# Patient Record
Sex: MALE | Race: WHITE | Employment: FULL TIME | URBAN - METROPOLITAN AREA
[De-identification: names, ages, dates, MRNs, and addresses within clinical notes are randomized per-mention and may not be internally consistent; named-entity substitution may affect disease eponyms.]

---

## 2017-05-17 ENCOUNTER — RECORDS - HEALTHEAST (OUTPATIENT)
Dept: LAB | Facility: CLINIC | Age: 52
End: 2017-05-17

## 2017-05-17 LAB
CHOLEST SERPL-MCNC: 211 MG/DL
FASTING STATUS PATIENT QL REPORTED: ABNORMAL
HDLC SERPL-MCNC: 58 MG/DL
LDLC SERPL CALC-MCNC: 136 MG/DL
PSA SERPL-MCNC: 0.4 NG/ML (ref 0–3.5)
TRIGL SERPL-MCNC: 84 MG/DL

## 2021-05-25 ENCOUNTER — RECORDS - HEALTHEAST (OUTPATIENT)
Dept: ADMINISTRATIVE | Facility: CLINIC | Age: 56
End: 2021-05-25

## 2021-05-31 ENCOUNTER — RECORDS - HEALTHEAST (OUTPATIENT)
Dept: ADMINISTRATIVE | Facility: CLINIC | Age: 56
End: 2021-05-31

## 2023-04-27 ENCOUNTER — LAB REQUISITION (OUTPATIENT)
Dept: LAB | Facility: CLINIC | Age: 58
End: 2023-04-27
Payer: COMMERCIAL

## 2023-04-27 ENCOUNTER — TRANSFERRED RECORDS (OUTPATIENT)
Dept: HEALTH INFORMATION MANAGEMENT | Facility: CLINIC | Age: 58
End: 2023-04-27

## 2023-04-27 DIAGNOSIS — Z13.1 ENCOUNTER FOR SCREENING FOR DIABETES MELLITUS: ICD-10-CM

## 2023-04-27 DIAGNOSIS — Z12.5 ENCOUNTER FOR SCREENING FOR MALIGNANT NEOPLASM OF PROSTATE: ICD-10-CM

## 2023-04-27 DIAGNOSIS — R26.89 OTHER ABNORMALITIES OF GAIT AND MOBILITY: ICD-10-CM

## 2023-04-27 DIAGNOSIS — Z13.220 ENCOUNTER FOR SCREENING FOR LIPOID DISORDERS: ICD-10-CM

## 2023-04-27 PROCEDURE — 80053 COMPREHEN METABOLIC PANEL: CPT | Mod: ORL | Performed by: PHYSICIAN ASSISTANT

## 2023-04-27 PROCEDURE — 80061 LIPID PANEL: CPT | Mod: ORL | Performed by: PHYSICIAN ASSISTANT

## 2023-04-27 PROCEDURE — 82607 VITAMIN B-12: CPT | Mod: ORL | Performed by: PHYSICIAN ASSISTANT

## 2023-04-27 PROCEDURE — 84443 ASSAY THYROID STIM HORMONE: CPT | Mod: ORL | Performed by: PHYSICIAN ASSISTANT

## 2023-04-27 PROCEDURE — 82746 ASSAY OF FOLIC ACID SERUM: CPT | Mod: ORL | Performed by: PHYSICIAN ASSISTANT

## 2023-04-27 PROCEDURE — G0103 PSA SCREENING: HCPCS | Mod: ORL | Performed by: PHYSICIAN ASSISTANT

## 2023-04-28 LAB
ALBUMIN SERPL BCG-MCNC: 4.5 G/DL (ref 3.5–5.2)
ALP SERPL-CCNC: 46 U/L (ref 40–129)
ALT SERPL W P-5'-P-CCNC: 13 U/L (ref 10–50)
ANION GAP SERPL CALCULATED.3IONS-SCNC: 12 MMOL/L (ref 7–15)
AST SERPL W P-5'-P-CCNC: 17 U/L (ref 10–50)
BILIRUB SERPL-MCNC: 0.4 MG/DL
BUN SERPL-MCNC: 18 MG/DL (ref 6–20)
CALCIUM SERPL-MCNC: 9.7 MG/DL (ref 8.6–10)
CHLORIDE SERPL-SCNC: 105 MMOL/L (ref 98–107)
CHOLEST SERPL-MCNC: 210 MG/DL
CREAT SERPL-MCNC: 1.13 MG/DL (ref 0.67–1.17)
DEPRECATED HCO3 PLAS-SCNC: 27 MMOL/L (ref 22–29)
FOLATE SERPL-MCNC: 20.4 NG/ML (ref 4.6–34.8)
GFR SERPL CREATININE-BSD FRML MDRD: 76 ML/MIN/1.73M2
GLUCOSE SERPL-MCNC: 108 MG/DL (ref 70–99)
HDLC SERPL-MCNC: 60 MG/DL
LDLC SERPL CALC-MCNC: 134 MG/DL
NONHDLC SERPL-MCNC: 150 MG/DL
POTASSIUM SERPL-SCNC: 5 MMOL/L (ref 3.4–5.3)
PROT SERPL-MCNC: 7.3 G/DL (ref 6.4–8.3)
PSA SERPL DL<=0.01 NG/ML-MCNC: 0.5 NG/ML (ref 0–3.5)
SODIUM SERPL-SCNC: 144 MMOL/L (ref 136–145)
TRIGL SERPL-MCNC: 80 MG/DL
TSH SERPL DL<=0.005 MIU/L-ACNC: 14.3 UIU/ML (ref 0.3–4.2)
VIT B12 SERPL-MCNC: 658 PG/ML (ref 232–1245)

## 2023-05-02 ENCOUNTER — MEDICAL CORRESPONDENCE (OUTPATIENT)
Dept: HEALTH INFORMATION MANAGEMENT | Facility: CLINIC | Age: 58
End: 2023-05-02
Payer: COMMERCIAL

## 2023-06-21 ENCOUNTER — OFFICE VISIT (OUTPATIENT)
Dept: CARDIOLOGY | Facility: CLINIC | Age: 58
End: 2023-06-21
Payer: COMMERCIAL

## 2023-06-21 VITALS
OXYGEN SATURATION: 96 % | DIASTOLIC BLOOD PRESSURE: 64 MMHG | SYSTOLIC BLOOD PRESSURE: 108 MMHG | RESPIRATION RATE: 16 BRPM | WEIGHT: 203.4 LBS | HEART RATE: 73 BPM

## 2023-06-21 DIAGNOSIS — E78.5 HYPERLIPIDEMIA LDL GOAL <100: ICD-10-CM

## 2023-06-21 DIAGNOSIS — R07.2 PRECORDIAL PAIN: Primary | ICD-10-CM

## 2023-06-21 PROCEDURE — 99204 OFFICE O/P NEW MOD 45 MIN: CPT | Performed by: INTERNAL MEDICINE

## 2023-06-21 PROCEDURE — 93000 ELECTROCARDIOGRAM COMPLETE: CPT | Performed by: INTERNAL MEDICINE

## 2023-06-21 RX ORDER — LEVOTHYROXINE SODIUM 50 UG/1
TABLET ORAL
COMMUNITY
Start: 2023-04-28

## 2023-06-21 NOTE — PROGRESS NOTES
HEART CARE ENCOUNTER CONSULTATON NOTE      [facility]  [phone]      Assessment/Recommendations   Assessment/Plan:  1.  Chest discomfort.  Symptoms have some atypical features.  He does however have some risk factors for coronary artery disease and the chest discomfort has persisted over a number month period of time.  We discussed additional work-up and we will plan an exercise stress test as well as a resting echocardiogram.  I discussed this in detail and he is in agreement.    2.  Risk modification.  We talked about coronary calcium scoring to further define his 10-year cardiovascular risk.  He has a tobacco history and mild hyperlipidemia.  He is interested in pursuing coronary calcium scoring we will arrange as well in the near future.  He is encouraged to fully discontinue tobacco.    3.  Patient mentions concern regarding balance issues that were apparently worked up quite significantly with neurology without obvious etiology based on chart notes and discussion with patient.  Did suggest that the patient consider contacting the dizzy and balance clinic for further assessment.    Plan 1.  GXT  2.  Resting echocardiogram  3.  Coronary calcium scoring    CG today demonstrates sinus rhythm, left axis deviation with minimal changes to suggest LVH.         History of Present Illness/Subjective    HPI: Jeff Gunn is a 57 year old male new patient to me today.  I have reviewed the most recent notes from primary care.  Primary care indicates history of episodic chest discomfort.  The description by the primary care physician was that the discomfort feels achy but sometimes a squeezing sensation.  The symptoms did not reportedly get worse with eating, chest wall movement or with exertion.  The note indicated no associated shortness of breath, palpitation or dizziness.  In addition he has had concerns regarding balance and double vision concerns regarding difficulty speaking.   Work-up per the chart record has included MRI of the brain, neurology consult, neuro-ophthalmology consult and EEG which reportedly was all unremarkable. .  Patient was started on omeprazole per primary care the stress echocardiogram was ordered but the results are not I did not certain whether it was completed.  No recent ECG available in the chart record.  ECG from June 19, 2008 was within normal limits.    Patient reports to me a similar story.  He states that for a number of months he has had episodic chest discomfort that short period of time perhaps upwards of 1 to 2 minutes but no longer.  Mid substernal, nonradiating described as an achy sensation which comes on abruptly and then slowly resolves.  He did not find any significant improvement with the omeprazole and reportedly stopped the omeprazole.  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  However he walks regularly including most recently on a vacation upwards of 10 miles denies predictable chest discomfort.    Cardiac risk factors include tobacco history.  He previously smoked 1 to 2 packs/week but now smokes 1 cigarette on occasion..  He drinks alcohol 2-3 times per week.  He does not have a history of hypertension, or diabetes.  Lipids are mildly suboptimal with most recent results revealing cholesterol 210, triglycerides of 80, .  No knowledge of premature family history of heart disease.      Past medical history  History of double vision and vertigo.  History of tobacco use    Family history, father is alive at age 76 estranged.  They are alive with hypothyroid and colon polyps.  Paternal grandmother with stroke, colon cancer and breast cancer  Paternal uncle with colon cancer             Physical Examination  Review of Systems   Vitals: 108/64, weight 203 pounds, rate of 73 and regular.  Wt Readings from Last 3  Encounters:   No data found for Wt       General Appearance:   no distress, normal body habitus   ENT/Mouth: membranes moist, no oral lesions or bleeding gums.      EYES:  no scleral icterus, normal conjunctivae   Neck: no carotid bruits or thyromegaly   Chest/Lungs:   lungs are clear to auscultation, no rales or wheezing,equal chest wall expansion    Cardiovascular:   Regular. Normal first and second heart sounds with no murmurs, rubs, or gallops; the carotid, radial and posterior tibial pulses are intact, Jugular venous pressure within normal limits, no significant edema bilaterally    Abdomen:  no bruits, or tenderness; bowel sounds are present   Extremities: no cyanosis or clubbing   Skin: no xanthelasma, warm.    Neurologic: , no tremors     Psychiatric: alert and oriented x3, calm        Please refer above for cardiac ROS details.        Medical History  Surgical History Family History Social History   No past medical history on file.  No past surgical history on file.  No family history on file.     Social History     Socioeconomic History     Marital status:      Spouse name: Not on file     Number of children: Not on file     Years of education: Not on file     Highest education level: Not on file   Occupational History     Not on file   Tobacco Use     Smoking status: Not on file     Smokeless tobacco: Not on file   Substance and Sexual Activity     Alcohol use: Not on file     Drug use: Not on file     Sexual activity: Not on file   Other Topics Concern     Not on file   Social History Narrative     Not on file     Social Determinants of Health     Financial Resource Strain: Not on file   Food Insecurity: Not on file   Transportation Needs: Not on file   Physical Activity: Not on file   Stress: Not on file   Social Connections: Not on file   Intimate Partner Violence: Not on file   Housing Stability: Not on file           Medications  Allergies   No current outpatient medications on file.     Not  on File       Lab Results    Chemistry/lipid CBC Cardiac Enzymes/BNP/TSH/INR   Recent Labs   Lab Test 04/27/23 0752   CHOL 210*   HDL 60   *   TRIG 80     Recent Labs   Lab Test 04/27/23 0752 05/17/17  0814   * 136*     Recent Labs   Lab Test 04/27/23 0752      POTASSIUM 5.0   CHLORIDE 105   CO2 27   *   BUN 18.0   CR 1.13   GFRESTIMATED 76   NELSON 9.7     Recent Labs   Lab Test 04/27/23 0752   CR 1.13     No results for input(s): A1C in the last 11558 hours.       No results for input(s): WBC, HGB, HCT, MCV, PLT in the last 76265 hours.  No results for input(s): HGB in the last 95638 hours. No results for input(s): TROPONINI in the last 64455 hours.  No results for input(s): BNP, NTBNPI, NTBNP in the last 94668 hours.  Recent Labs   Lab Test 04/27/23 0752   TSH 14.30*     No results for input(s): INR in the last 35847 hours.     Jeff Cooper MD

## 2023-06-21 NOTE — PATIENT INSTRUCTIONS
We talked about doing a stress test and a heart ultrasound.We are going to plan a coronary calcium ct test which is were we do a 5 minute ct picture of the heart blood vessels without needles.We also talked about the dizzy and balance clinic there number [phone].My nurse is Janel and her number is [phone]

## 2023-06-21 NOTE — LETTER
6/21/2023    Magen Mackay MD  [address]  [facility] [address]    RE: Jeff Gunn       Dear Colleague,     I had the pleasure of seeing Jeff Gunn in the [facility].    HEART CARE ENCOUNTER CONSULTATON NOTE      [facility] [place]  [phone]      Assessment/Recommendations   Assessment/Plan:  1.  Chest discomfort.  Symptoms have some atypical features.  He does however have some risk factors for coronary artery disease and the chest discomfort has persisted over a number month period of time.  We discussed additional work-up and we will plan an exercise stress test as well as a resting echocardiogram.  I discussed this in detail and he is in agreement.    2.  Risk modification.  We talked about coronary calcium scoring to further define his 10-year cardiovascular risk.  He has a tobacco history and mild hyperlipidemia.  He is interested in pursuing coronary calcium scoring we will arrange as well in the near future.  He is encouraged to fully discontinue tobacco.    3.  Patient mentions concern regarding balance issues that were apparently worked up quite significantly with neurology without obvious etiology based on chart notes and discussion with patient.  Did suggest that the patient consider contacting the dizzy and balance clinic for further assessment.    Plan 1.  GXT  2.  Resting echocardiogram  3.  Coronary calcium scoring    CG today demonstrates sinus rhythm, left axis deviation with minimal changes to suggest LVH.         History of Present Illness/Subjective    HPI: Jeff Gunn is a 57 year old male new patient to me today.  I have reviewed the most recent notes from primary care.  Primary care indicates history of episodic chest discomfort.  The description by the primary care physician was that the discomfort feels achy but sometimes a squeezing sensation.  The symptoms did not reportedly get worse with eating, chest wall movement  or with exertion.  The note indicated no associated shortness of breath, palpitation or dizziness.  In addition he has had concerns regarding balance and double vision concerns regarding difficulty speaking.  Work-up per the chart record has included MRI of the brain, neurology consult, neuro-ophthalmology consult and EEG which reportedly was all unremarkable. .  Patient was started on omeprazole per primary care the stress echocardiogram was ordered but the results are not I did not certain whether it was completed.  No recent ECG available in the chart record.  ECG from June 19, 2008 was within normal limits.    Patient reports to me a similar story.  He states that for a number of months he has had episodic chest discomfort that short period of time perhaps upwards of 1 to 2 minutes but no longer.  Mid substernal, nonradiating described as an achy sensation which comes on abruptly and then slowly resolves.  He did not find any significant improvement with the omeprazole and reportedly stopped the omeprazole.  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  However he walks regularly including most recently on a vacation upwards of 10 miles denies predictable chest discomfort.    Cardiac risk factors include tobacco history.  He previously smoked 1 to 2 packs/week but now smokes 1 cigarette on occasion..  He drinks alcohol 2-3 times per week.  He does not have a history of hypertension, or diabetes.  Lipids are mildly suboptimal with most recent results revealing cholesterol 210, triglycerides of 80, .  No knowledge of premature family history of heart disease.      Past medical history  History of double vision and vertigo.  History of tobacco use    Family history, father is alive at age 76 estranged.  They are alive with hypothyroid and colon polyps.  Paternal grandmother with  stroke, colon cancer and breast cancer  Paternal uncle with colon cancer             Physical Examination  Review of Systems   Vitals: 108/64, weight 203 pounds, rate of 73 and regular.  Wt Readings from Last 3 Encounters:   No data found for Wt       General Appearance:   no distress, normal body habitus   ENT/Mouth: membranes moist, no oral lesions or bleeding gums.      EYES:  no scleral icterus, normal conjunctivae   Neck: no carotid bruits or thyromegaly   Chest/Lungs:   lungs are clear to auscultation, no rales or wheezing,equal chest wall expansion    Cardiovascular:   Regular. Normal first and second heart sounds with no murmurs, rubs, or gallops; the carotid, radial and posterior tibial pulses are intact, Jugular venous pressure within normal limits, no significant edema bilaterally    Abdomen:  no bruits, or tenderness; bowel sounds are present   Extremities: no cyanosis or clubbing   Skin: no xanthelasma, warm.    Neurologic: , no tremors     Psychiatric: alert and oriented x3, calm        Please refer above for cardiac ROS details.        Medical History  Surgical History Family History Social History   No past medical history on file.  No past surgical history on file.  No family history on file.     Social History     Socioeconomic History    Marital status:      Spouse name: Not on file    Number of children: Not on file    Years of education: Not on file    Highest education level: Not on file   Occupational History    Not on file   Tobacco Use    Smoking status: Not on file    Smokeless tobacco: Not on file   Substance and Sexual Activity    Alcohol use: Not on file    Drug use: Not on file    Sexual activity: Not on file   Other Topics Concern    Not on file   Social History Narrative    Not on file     Social Determinants of Health     Financial Resource Strain: Not on file   Food Insecurity: Not on file   Transportation Needs: Not on file   Physical Activity: Not on file   Stress: Not on  file   Social Connections: Not on file   Intimate Partner Violence: Not on file   Housing Stability: Not on file           Medications  Allergies   No current outpatient medications on file.     Not on File       Lab Results    Chemistry/lipid CBC Cardiac Enzymes/BNP/TSH/INR   Recent Labs   Lab Test 04/27/23 0752   CHOL 210*   HDL 60   *   TRIG 80     Recent Labs   Lab Test 04/27/23 0752 05/17/17  0814   * 136*     Recent Labs   Lab Test 04/27/23 0752      POTASSIUM 5.0   CHLORIDE 105   CO2 27   *   BUN 18.0   CR 1.13   GFRESTIMATED 76   NELSON 9.7     Recent Labs   Lab Test 04/27/23 0752   CR 1.13     No results for input(s): A1C in the last 90971 hours.       No results for input(s): WBC, HGB, HCT, MCV, PLT in the last 77509 hours.  No results for input(s): HGB in the last 95909 hours. No results for input(s): TROPONINI in the last 62143 hours.  No results for input(s): BNP, NTBNPI, NTBNP in the last 46587 hours.  Recent Labs   Lab Test 04/27/23 0752   TSH 14.30*     No results for input(s): INR in the last 43387 hours.     Jeff Cooper MD                                        Thank you for allowing me to participate in the care of your patient.      Sincerely,     Jeff Cooper MD     [facility]  cc:   No referring provider defined for this encounter.

## 2023-06-22 LAB
ATRIAL RATE - MUSE: 69 BPM
DIASTOLIC BLOOD PRESSURE - MUSE: NORMAL MMHG
INTERPRETATION ECG - MUSE: NORMAL
P AXIS - MUSE: 6 DEGREES
PR INTERVAL - MUSE: 142 MS
QRS DURATION - MUSE: 96 MS
QT - MUSE: 374 MS
QTC - MUSE: 400 MS
R AXIS - MUSE: -48 DEGREES
SYSTOLIC BLOOD PRESSURE - MUSE: NORMAL MMHG
T AXIS - MUSE: 19 DEGREES
VENTRICULAR RATE- MUSE: 69 BPM

## 2023-06-30 ENCOUNTER — HOSPITAL ENCOUNTER (OUTPATIENT)
Dept: CT IMAGING | Facility: CLINIC | Age: 58
Discharge: HOME | End: 2023-06-30
Attending: INTERNAL MEDICINE | Admitting: INTERNAL MEDICINE
Payer: COMMERCIAL

## 2023-06-30 DIAGNOSIS — E78.5 HYPERLIPIDEMIA LDL GOAL <100: ICD-10-CM

## 2023-06-30 PROCEDURE — 75571 CT HRT W/O DYE W/CA TEST: CPT

## 2023-06-30 PROCEDURE — 75571 CT HRT W/O DYE W/CA TEST: CPT | Mod: 26 | Performed by: INTERNAL MEDICINE

## 2023-07-03 LAB
CV CALCIUM SCORE AGATSTON LM: 0
CV CALCIUM SCORING AGATSON LAD: 0
CV CALCIUM SCORING AGATSTON CX: 0
CV CALCIUM SCORING AGATSTON RCA: 0
CV CALCIUM SCORING AGATSTON TOTAL: 0

## 2023-07-18 ENCOUNTER — HOSPITAL ENCOUNTER (OUTPATIENT)
Dept: CARDIOLOGY | Facility: HOSPITAL | Age: 58
Discharge: HOME | End: 2023-07-18
Attending: INTERNAL MEDICINE | Admitting: INTERNAL MEDICINE
Payer: COMMERCIAL

## 2023-07-18 DIAGNOSIS — R07.2 PRECORDIAL PAIN: ICD-10-CM

## 2023-07-18 LAB
CV STRESS CURRENT BP HE: NORMAL
CV STRESS CURRENT HR HE: 102
CV STRESS CURRENT HR HE: 103
CV STRESS CURRENT HR HE: 107
CV STRESS CURRENT HR HE: 107
CV STRESS CURRENT HR HE: 110
CV STRESS CURRENT HR HE: 111
CV STRESS CURRENT HR HE: 111
CV STRESS CURRENT HR HE: 117
CV STRESS CURRENT HR HE: 125
CV STRESS CURRENT HR HE: 131
CV STRESS CURRENT HR HE: 131
CV STRESS CURRENT HR HE: 133
CV STRESS CURRENT HR HE: 134
CV STRESS CURRENT HR HE: 136
CV STRESS CURRENT HR HE: 140
CV STRESS CURRENT HR HE: 151
CV STRESS CURRENT HR HE: 155
CV STRESS CURRENT HR HE: 161
CV STRESS CURRENT HR HE: 163
CV STRESS CURRENT HR HE: 164
CV STRESS CURRENT HR HE: 165
CV STRESS CURRENT HR HE: 172
CV STRESS CURRENT HR HE: 172
CV STRESS CURRENT HR HE: 174
CV STRESS CURRENT HR HE: 176
CV STRESS CURRENT HR HE: 76
CV STRESS CURRENT HR HE: 86
CV STRESS DEVIATION TIME HE: NORMAL
CV STRESS ECHO PERCENT HR HE: NORMAL
CV STRESS EXERCISE STAGE HE: NORMAL
CV STRESS EXERCISE STAGE REACHED HE: NORMAL
CV STRESS FINAL RESTING BP HE: NORMAL
CV STRESS FINAL RESTING HR HE: 86
CV STRESS MAX HR HE: 176
CV STRESS MAX TREADMILL GRADE HE: 16
CV STRESS MAX TREADMILL SPEED HE: 4.2
CV STRESS PEAK DIA BP HE: NORMAL
CV STRESS PEAK SYS BP HE: NORMAL
CV STRESS PHASE HE: NORMAL
CV STRESS PROTOCOL HE: NORMAL
CV STRESS REASON STOPPED HE: NORMAL
CV STRESS RESTING PT POSITION HE: NORMAL
CV STRESS ST DEVIATION AMOUNT HE: NORMAL
CV STRESS ST DEVIATION ELEVATION HE: NORMAL
CV STRESS ST EVELATION AMOUNT HE: NORMAL
CV STRESS SYMPTOMS HE: NORMAL
CV STRESS TEST TYPE HE: NORMAL
CV STRESS TOTAL STAGE TIME MIN 1 HE: NORMAL
STRESS ECHO BASELINE DIASTOLIC HE: 65
STRESS ECHO BASELINE HR: 75
STRESS ECHO BASELINE SYSTOLIC BP: 106
STRESS ECHO LAST STRESS DIASTOLIC BP: 70
STRESS ECHO LAST STRESS HR: 174
STRESS ECHO LAST STRESS SYSTOLIC BP: 174
STRESS ECHO POST ESTIMATED WORKLOAD: 11.1
STRESS ECHO POST EXERCISE DUR MIN: 9
STRESS ECHO POST EXERCISE DUR SEC: 30
STRESS ECHO TARGET HR: 139

## 2023-07-18 PROCEDURE — 93018 CV STRESS TEST I&R ONLY: CPT | Performed by: INTERNAL MEDICINE

## 2023-07-18 PROCEDURE — 93017 CV STRESS TEST TRACING ONLY: CPT

## 2023-07-18 PROCEDURE — 93016 CV STRESS TEST SUPVJ ONLY: CPT | Performed by: INTERNAL MEDICINE

## 2023-08-02 ENCOUNTER — LAB REQUISITION (OUTPATIENT)
Dept: LAB | Facility: CLINIC | Age: 58
End: 2023-08-02
Payer: COMMERCIAL

## 2023-08-02 DIAGNOSIS — E03.9 HYPOTHYROIDISM, UNSPECIFIED: ICD-10-CM

## 2023-08-02 LAB
T4 FREE SERPL-MCNC: 1.19 NG/DL (ref 0.9–1.7)
TSH SERPL DL<=0.005 MIU/L-ACNC: 3.29 UIU/ML (ref 0.3–4.2)

## 2023-08-02 PROCEDURE — 84439 ASSAY OF FREE THYROXINE: CPT | Mod: ORL | Performed by: PHYSICIAN ASSISTANT

## 2023-08-02 PROCEDURE — 84443 ASSAY THYROID STIM HORMONE: CPT | Mod: ORL | Performed by: PHYSICIAN ASSISTANT

## 2023-08-24 ENCOUNTER — HOSPITAL ENCOUNTER (OUTPATIENT)
Dept: CARDIOLOGY | Facility: HOSPITAL | Age: 58
Discharge: HOME | End: 2023-08-24
Attending: INTERNAL MEDICINE | Admitting: INTERNAL MEDICINE
Payer: COMMERCIAL

## 2023-08-24 DIAGNOSIS — R07.2 PRECORDIAL PAIN: ICD-10-CM

## 2023-08-24 LAB — LVEF ECHO: NORMAL

## 2023-08-24 PROCEDURE — 93306 TTE W/DOPPLER COMPLETE: CPT | Mod: 26 | Performed by: INTERNAL MEDICINE

## 2023-08-24 PROCEDURE — 93306 TTE W/DOPPLER COMPLETE: CPT

## 2023-08-28 NOTE — RESULT ENCOUNTER NOTE
Echo read as normal function, no significant valve findings, gxt a few months ago was normal, ct calcium score was zero, mild increase in lipids 4/2023, suggest diet and exercise and recheck lipids in 6 to 12 months, can we inquire as to how the chest discomfort he described has been?  Ty mdg

## 2024-12-11 ENCOUNTER — LAB REQUISITION (OUTPATIENT)
Dept: LAB | Facility: CLINIC | Age: 59
End: 2024-12-11
Payer: COMMERCIAL

## 2024-12-11 DIAGNOSIS — Z00.00 ENCOUNTER FOR GENERAL ADULT MEDICAL EXAMINATION WITHOUT ABNORMAL FINDINGS: ICD-10-CM

## 2024-12-11 DIAGNOSIS — E03.9 HYPOTHYROIDISM, UNSPECIFIED: ICD-10-CM

## 2024-12-11 LAB
ALBUMIN SERPL BCG-MCNC: 4.1 G/DL (ref 3.5–5.2)
ALP SERPL-CCNC: 53 U/L (ref 40–150)
ALT SERPL W P-5'-P-CCNC: 15 U/L (ref 0–70)
ANION GAP SERPL CALCULATED.3IONS-SCNC: 10 MMOL/L (ref 7–15)
AST SERPL W P-5'-P-CCNC: 18 U/L (ref 0–45)
BILIRUB SERPL-MCNC: 0.6 MG/DL
BUN SERPL-MCNC: 14.1 MG/DL (ref 6–20)
CALCIUM SERPL-MCNC: 8.9 MG/DL (ref 8.8–10.4)
CHLORIDE SERPL-SCNC: 103 MMOL/L (ref 98–107)
CHOLEST SERPL-MCNC: 194 MG/DL
CREAT SERPL-MCNC: 1.11 MG/DL (ref 0.67–1.17)
EGFRCR SERPLBLD CKD-EPI 2021: 77 ML/MIN/1.73M2
FASTING STATUS PATIENT QL REPORTED: NO
FASTING STATUS PATIENT QL REPORTED: NO
GLUCOSE SERPL-MCNC: 95 MG/DL (ref 70–99)
HCO3 SERPL-SCNC: 25 MMOL/L (ref 22–29)
HDLC SERPL-MCNC: 69 MG/DL
LDLC SERPL CALC-MCNC: 110 MG/DL
NONHDLC SERPL-MCNC: 125 MG/DL
POTASSIUM SERPL-SCNC: 4.2 MMOL/L (ref 3.4–5.3)
PROT SERPL-MCNC: 6.7 G/DL (ref 6.4–8.3)
SODIUM SERPL-SCNC: 138 MMOL/L (ref 135–145)
TRIGL SERPL-MCNC: 77 MG/DL
TSH SERPL DL<=0.005 MIU/L-ACNC: 2.97 UIU/ML (ref 0.3–4.2)